# Patient Record
Sex: FEMALE | Race: WHITE | Employment: UNEMPLOYED | URBAN - METROPOLITAN AREA
[De-identification: names, ages, dates, MRNs, and addresses within clinical notes are randomized per-mention and may not be internally consistent; named-entity substitution may affect disease eponyms.]

---

## 2017-04-13 ENCOUNTER — APPOINTMENT (OUTPATIENT)
Dept: CT IMAGING | Age: 32
End: 2017-04-13
Attending: EMERGENCY MEDICINE
Payer: COMMERCIAL

## 2017-04-13 ENCOUNTER — HOSPITAL ENCOUNTER (EMERGENCY)
Age: 32
Discharge: HOME | End: 2017-04-13
Attending: EMERGENCY MEDICINE
Payer: COMMERCIAL

## 2017-04-13 VITALS
DIASTOLIC BLOOD PRESSURE: 65 MMHG | WEIGHT: 115 LBS | HEART RATE: 70 BPM | HEIGHT: 63 IN | RESPIRATION RATE: 16 BRPM | TEMPERATURE: 98 F | SYSTOLIC BLOOD PRESSURE: 108 MMHG | OXYGEN SATURATION: 99 % | BODY MASS INDEX: 20.37 KG/M2

## 2017-04-13 DIAGNOSIS — R11.0 NAUSEA: ICD-10-CM

## 2017-04-13 DIAGNOSIS — R10.9 ABDOMINAL PAIN, UNSPECIFIED LOCATION: Primary | ICD-10-CM

## 2017-04-13 PROCEDURE — 96374 THER/PROPH/DIAG INJ IV PUSH: CPT

## 2017-04-13 PROCEDURE — 85025 COMPLETE CBC W/AUTO DIFF WBC: CPT | Performed by: EMERGENCY MEDICINE

## 2017-04-13 PROCEDURE — 96361 HYDRATE IV INFUSION ADD-ON: CPT

## 2017-04-13 PROCEDURE — 99284 EMERGENCY DEPT VISIT MOD MDM: CPT

## 2017-04-13 PROCEDURE — 96375 TX/PRO/DX INJ NEW DRUG ADDON: CPT

## 2017-04-13 PROCEDURE — 80053 COMPREHEN METABOLIC PANEL: CPT | Performed by: EMERGENCY MEDICINE

## 2017-04-13 PROCEDURE — 81025 URINE PREGNANCY TEST: CPT

## 2017-04-13 PROCEDURE — 81003 URINALYSIS AUTO W/O SCOPE: CPT | Performed by: EMERGENCY MEDICINE

## 2017-04-13 PROCEDURE — 74177 CT ABD & PELVIS W/CONTRAST: CPT

## 2017-04-13 PROCEDURE — 83690 ASSAY OF LIPASE: CPT | Performed by: EMERGENCY MEDICINE

## 2017-04-13 RX ORDER — POLYETHYLENE GLYCOL 3350 17 G/17G
17 POWDER, FOR SOLUTION ORAL DAILY PRN
Qty: 30 EACH | Refills: 0 | Status: SHIPPED | OUTPATIENT
Start: 2017-04-13 | End: 2017-04-20

## 2017-04-13 RX ORDER — ONDANSETRON 4 MG/1
4 TABLET, ORALLY DISINTEGRATING ORAL EVERY 4 HOURS PRN
Qty: 20 TABLET | Refills: 0 | Status: SHIPPED | OUTPATIENT
Start: 2017-04-13

## 2017-04-13 RX ORDER — ONDANSETRON 2 MG/ML
4 INJECTION INTRAMUSCULAR; INTRAVENOUS ONCE
Status: COMPLETED | OUTPATIENT
Start: 2017-04-13 | End: 2017-04-13

## 2017-04-13 RX ORDER — BUPROPION HYDROCHLORIDE 150 MG/1
150 TABLET ORAL DAILY
COMMUNITY

## 2017-04-13 RX ORDER — DICYCLOMINE HCL 20 MG
20 TABLET ORAL 4 TIMES DAILY PRN
Qty: 30 TABLET | Refills: 0 | Status: SHIPPED | OUTPATIENT
Start: 2017-04-13 | End: 2017-05-13

## 2017-04-13 RX ORDER — SODIUM CHLORIDE 9 MG/ML
INJECTION, SOLUTION INTRAVENOUS CONTINUOUS
Status: DISCONTINUED | OUTPATIENT
Start: 2017-04-13 | End: 2017-04-14

## 2017-04-13 RX ORDER — KETOROLAC TROMETHAMINE 30 MG/ML
30 INJECTION, SOLUTION INTRAMUSCULAR; INTRAVENOUS ONCE
Status: COMPLETED | OUTPATIENT
Start: 2017-04-13 | End: 2017-04-13

## 2017-04-14 NOTE — ED PROVIDER NOTES
Patient Seen in: THE [facility] Emergency Department In [place]    History   Patient presents with:  Abdomen/Flank Pain (GI/)    Stated Complaint: ABD PAIN CRAMPING    HPI  This is a 41-year-old female who arrives with abdominal pain, cramping starting this Father    • Diabetes Maternal Grandmother    • Hypertension Paternal Grandmother    • Breast Cancer Maternal Grandmother    • Breast Cancer Other      M.Gr. Aunt x3   • High Cholesterol Father    • Colon Cancer [Other] Abhishek Look Maternal Grandmother There is no edema    NEURO: Alert and oriented x3. Muscle strength and sensory exam is grossly normal.  And the patient is neurologically intact with no focal findings.       ED Course     Labs Reviewed   URINALYSIS WITH CULTURE REFLEX - Abnormal; Notable physician for close follow-up. If there is any significant progression of your pain  or other symptoms you to return immediately to the emergency room.   My discharge instruct    Disposition and Plan     Clinical Impression:  Abdominal pain, unspecified lo

## 2017-09-18 NOTE — ED AVS SNAPSHOT
Mrs. Jacqui Hurtado   MRN: [id number]    Department:  [facility] Emergency Department in [place]   Date of Visit:  1/16/2018           Disclosure     Insurance plans vary and the physician(s) referred by the ER may not be covered by your plan.  Brigido tell this physician (or your personal doctor if your instructions are to return to your personal doctor) about any new or lasting problems. The primary care or specialist physician will see patients referred from the [facility] Emergency Department.  Mireya Desouza Myself

## 2017-09-29 ENCOUNTER — OFFICE VISIT (OUTPATIENT)
Dept: FAMILY MEDICINE CLINIC | Facility: CLINIC | Age: 32
End: 2017-09-29

## 2017-09-29 VITALS
BODY MASS INDEX: 21.23 KG/M2 | WEIGHT: 119.81 LBS | OXYGEN SATURATION: 98 % | TEMPERATURE: 98 F | HEART RATE: 88 BPM | DIASTOLIC BLOOD PRESSURE: 72 MMHG | RESPIRATION RATE: 16 BRPM | SYSTOLIC BLOOD PRESSURE: 116 MMHG | HEIGHT: 63 IN

## 2017-09-29 DIAGNOSIS — J30.9 ALLERGIC CONJUNCTIVITIS AND RHINITIS, BILATERAL: ICD-10-CM

## 2017-09-29 DIAGNOSIS — J01.00 ACUTE MAXILLARY SINUSITIS, RECURRENCE NOT SPECIFIED: Primary | ICD-10-CM

## 2017-09-29 DIAGNOSIS — S05.01XA ABRASION OF RIGHT CORNEA, INITIAL ENCOUNTER: ICD-10-CM

## 2017-09-29 DIAGNOSIS — H10.13 ALLERGIC CONJUNCTIVITIS AND RHINITIS, BILATERAL: ICD-10-CM

## 2017-09-29 PROCEDURE — 99213 OFFICE O/P EST LOW 20 MIN: CPT | Performed by: NURSE PRACTITIONER

## 2017-09-29 RX ORDER — NORGESTIMATE AND ETHINYL ESTRADIOL 7DAYSX3 LO
KIT ORAL
COMMUNITY
Start: 2017-05-05 | End: 2018-05-24

## 2017-09-29 RX ORDER — ZOLPIDEM TARTRATE 10 MG/1
TABLET ORAL
Refills: 0 | COMMUNITY
Start: 2017-09-18

## 2017-09-29 RX ORDER — AMOXICILLIN AND CLAVULANATE POTASSIUM 875; 125 MG/1; MG/1
1 TABLET, FILM COATED ORAL 2 TIMES DAILY
Qty: 20 TABLET | Refills: 0 | Status: SHIPPED | OUTPATIENT
Start: 2017-09-29 | End: 2017-10-09

## 2017-09-29 RX ORDER — OFLOXACIN 3 MG/ML
2 SOLUTION/ DROPS OPHTHALMIC 4 TIMES DAILY
Qty: 1 BOTTLE | Refills: 0 | Status: SHIPPED | OUTPATIENT
Start: 2017-09-29 | End: 2017-10-06

## 2017-09-29 RX ORDER — LISDEXAMFETAMINE DIMESYLATE 70 MG/1
CAPSULE ORAL
Refills: 0 | COMMUNITY
Start: 2017-09-14

## 2017-09-29 RX ORDER — ADAPALENE 0.1 G/100G
CREAM TOPICAL
Refills: 1 | COMMUNITY
Start: 2017-09-20

## 2017-09-29 NOTE — PROGRESS NOTES
CHIEF COMPLAINT:   Patient presents with:  Irritation: right eye irritation x2-3 days feels like there is something in pt eye. she tried changing contacts, light sensitivity, fatigued, headaches, facial and sinus pressure.       HPI:   Mikel Oats is Linaclotide (LINZESS OR) Take  by mouth.  Disp:  Rfl:    Norgestimate-Ethinyl Estradiol (ORTHO TRI-CYCLEN LO) 0.18/0.215/0.25 MG-25 MCG Oral Tab One po q day Disp: 3 Package Rfl: 4      Past Medical History:   Diagnosis Date   • WILFRED II (cervical intraepithe HENT: atraumatic, normocephalic,ears and throat are clear.  + mild tenderness upon palpation of maxillary sinuses  NECK: supple, non tender  LUNGS: clear to auscultation bilaterally. CARDIO: RRR without murmur  LYMPH: no preauricular lymphadenopathy.  No Patient Instructions     No contacts for one week  Follow up with eye doctor if no improvement in symptoms  Humidifier in room  Sleep propped  Push fluids  Limit dairy  Daily Zyrtec or Claritin  Flonase as directed    Conjunctivitis, Allergic    Conjunctiv Call your healthcare provider right away if any of these occur:  · Increased eyelid swelling  · New or worsening drainage from the eye  · Increasing redness around the eye  · Facial swelling  Date Last Reviewed: 6/14/2015  © 1535-0415 The [employer], · If your vision is affected by the corneal abrasion or if an eye patch was applied, do not drive a motor vehicle or operate machinery until all symptoms are gone. You may have trouble judging distances using only one eye.   · If your eyes are sensitive to The cornea is very painful when injured, but it usually heals quickly. It usually improves within 24 to 48 hours. If the injury is deep, your healthcare provider may apply an eye patch. This is to reduce pain and speed up the healing process.  An antibiotic © 5225-2952 [address], [address] [place]. All rights reserved. This information is not intended as a substitute for professional medical care. Always follow your healthcare professional's instructions.         Sinusit · Over-the-counter decongestants may be used unless a similar medicine was prescribed. Nasal sprays work the fastest. Use one that contains phenylephrine or oxymetazoline. First blow the nose gently. Then use the spray.  Do not use these medicines more ofte © 3326-2952 The [address] [place] [address], [address] [place]. All rights reserved. This information is not intended as a substitute for professional medical care. Always follow your healthcare professional's instructions.             Colin

## 2017-09-29 NOTE — PATIENT INSTRUCTIONS
No contacts for one week  Follow up with eye doctor if no improvement in symptoms  Humidifier in room  Sleep propped  Push fluids  Limit dairy  Daily Zyrtec or Claritin  Flonase as directed    Conjunctivitis, Allergic    Conjunctivitis is an irritation o Call your healthcare provider right away if any of these occur:  · Increased eyelid swelling  · New or worsening drainage from the eye  · Increasing redness around the eye  · Facial swelling  Date Last Reviewed: 6/14/2015  © 7632-6674 The [employer], · If your vision is affected by the corneal abrasion or if an eye patch was applied, do not drive a motor vehicle or operate machinery until all symptoms are gone. You may have trouble judging distances using only one eye.   · If your eyes are sensitive to The cornea is very painful when injured, but it usually heals quickly. It usually improves within 24 to 48 hours. If the injury is deep, your healthcare provider may apply an eye patch. This is to reduce pain and speed up the healing process.  An antibiotic © 3562-6055 [address], [address] [place]. All rights reserved. This information is not intended as a substitute for professional medical care. Always follow your healthcare professional's instructions.         Sinusit · Over-the-counter decongestants may be used unless a similar medicine was prescribed. Nasal sprays work the fastest. Use one that contains phenylephrine or oxymetazoline. First blow the nose gently. Then use the spray.  Do not use these medicines more ofte © 4352-6342 [address], [address] [place]. All rights reserved. This information is not intended as a substitute for professional medical care. Always follow your healthcare professional's instructions.

## 2018-01-16 ENCOUNTER — APPOINTMENT (OUTPATIENT)
Dept: GENERAL RADIOLOGY | Age: 33
End: 2018-01-16
Attending: PHYSICIAN ASSISTANT
Payer: COMMERCIAL

## 2018-01-16 ENCOUNTER — HOSPITAL ENCOUNTER (EMERGENCY)
Age: 33
Discharge: HOME | End: 2018-01-16
Attending: EMERGENCY MEDICINE
Payer: COMMERCIAL

## 2018-01-16 VITALS
OXYGEN SATURATION: 97 % | BODY MASS INDEX: 21.26 KG/M2 | HEART RATE: 113 BPM | TEMPERATURE: 99 F | DIASTOLIC BLOOD PRESSURE: 71 MMHG | HEIGHT: 63 IN | WEIGHT: 120 LBS | RESPIRATION RATE: 18 BRPM | SYSTOLIC BLOOD PRESSURE: 120 MMHG

## 2018-01-16 DIAGNOSIS — J11.1 INFLUENZA-LIKE SYNDROME: Primary | ICD-10-CM

## 2018-01-16 PROCEDURE — 99283 EMERGENCY DEPT VISIT LOW MDM: CPT

## 2018-01-16 PROCEDURE — 71046 X-RAY EXAM CHEST 2 VIEWS: CPT | Performed by: PHYSICIAN ASSISTANT

## 2018-01-16 RX ORDER — OSELTAMIVIR PHOSPHATE 75 MG/1
75 CAPSULE ORAL 2 TIMES DAILY
Qty: 10 CAPSULE | Refills: 0 | Status: SHIPPED | OUTPATIENT
Start: 2018-01-16 | End: 2018-01-21

## 2018-01-16 RX ORDER — BENZONATATE 200 MG/1
200 CAPSULE ORAL 3 TIMES DAILY PRN
Qty: 30 CAPSULE | Refills: 0 | Status: SHIPPED | OUTPATIENT
Start: 2018-01-16 | End: 2018-01-23

## 2018-01-17 NOTE — ED PROVIDER NOTES
I reviewed that chart and discussed the case. I have examined the patient and noted complaints of  Body aches, cough, felt like her heart was slightly racing. When she coughs. .    Diffuse body aches, congestion, fevers, chills.   Lungs are clear heart rat

## 2018-01-17 NOTE — ED PROVIDER NOTES
Patient Seen in: [facility] Emergency Department In [place]    History   Patient presents with:  Fever (infectious)    Stated Complaint: flu    ART Gonzales is a 28yo F who comes in with complaints of cough, body aches, fever, chills that started today. gray color and semitransparent, external ear canals normal, both ears   Nose: Nares symmetrical, septum midline, mucosa normal, clear watery discharge; no sinus tenderness   Throat: Lips, tongue, and mucosa are moist, pink, and intact; teeth intact.  Mild e with the patient, who expresses understanding.  All questions and concerns are addressed to the patient's satisfaction prior to discharge today.           Disposition and Plan     Clinical Impression:  Influenza-like syndrome  (primary encounter diagnosis)

## 2018-03-01 ENCOUNTER — APPOINTMENT (OUTPATIENT)
Dept: CT IMAGING | Age: 33
End: 2018-03-01
Attending: EMERGENCY MEDICINE
Payer: COMMERCIAL

## 2018-03-01 ENCOUNTER — HOSPITAL ENCOUNTER (EMERGENCY)
Age: 33
Discharge: HOME | End: 2018-03-01
Attending: EMERGENCY MEDICINE
Payer: COMMERCIAL

## 2018-03-01 VITALS
OXYGEN SATURATION: 99 % | HEART RATE: 72 BPM | WEIGHT: 120 LBS | DIASTOLIC BLOOD PRESSURE: 68 MMHG | TEMPERATURE: 99 F | HEIGHT: 63 IN | SYSTOLIC BLOOD PRESSURE: 110 MMHG | RESPIRATION RATE: 16 BRPM | BODY MASS INDEX: 21.26 KG/M2

## 2018-03-01 DIAGNOSIS — J01.00 ACUTE NON-RECURRENT MAXILLARY SINUSITIS: Primary | ICD-10-CM

## 2018-03-01 LAB
ALBUMIN SERPL-MCNC: 3.5 G/DL (ref 3.5–4.8)
ALP LIVER SERPL-CCNC: 45 U/L (ref 37–98)
ALT SERPL-CCNC: 26 U/L (ref 14–54)
AST SERPL-CCNC: 19 U/L (ref 15–41)
BASOPHILS # BLD AUTO: 0.09 X10(3) UL (ref 0–0.1)
BASOPHILS NFR BLD AUTO: 1 %
BILIRUB SERPL-MCNC: 0.3 MG/DL (ref 0.1–2)
BUN BLD-MCNC: 10 MG/DL (ref 8–20)
CALCIUM BLD-MCNC: 8.9 MG/DL (ref 8.3–10.3)
CHLORIDE: 111 MMOL/L (ref 101–111)
CO2: 25 MMOL/L (ref 22–32)
CREAT BLD-MCNC: 0.67 MG/DL (ref 0.55–1.02)
EOSINOPHIL # BLD AUTO: 0.14 X10(3) UL (ref 0–0.3)
EOSINOPHIL NFR BLD AUTO: 1.5 %
ERYTHROCYTE [DISTWIDTH] IN BLOOD BY AUTOMATED COUNT: 12.3 % (ref 11.5–16)
GLUCOSE BLD-MCNC: 81 MG/DL (ref 70–99)
HCT VFR BLD AUTO: 37.7 % (ref 34–50)
HGB BLD-MCNC: 12.8 G/DL (ref 12–16)
IMMATURE GRANULOCYTE COUNT: 0.02 X10(3) UL (ref 0–1)
IMMATURE GRANULOCYTE RATIO %: 0.2 %
LYMPHOCYTES # BLD AUTO: 2.98 X10(3) UL (ref 0.9–4)
LYMPHOCYTES NFR BLD AUTO: 31.9 %
M PROTEIN MFR SERPL ELPH: 7.5 G/DL (ref 6.1–8.3)
MCH RBC QN AUTO: 30.6 PG (ref 27–33.2)
MCHC RBC AUTO-ENTMCNC: 34 G/DL (ref 31–37)
MCV RBC AUTO: 90.2 FL (ref 81–100)
MONOCYTES # BLD AUTO: 0.48 X10(3) UL (ref 0.1–1)
MONOCYTES NFR BLD AUTO: 5.1 %
NEUTROPHIL ABS PRELIM: 5.64 X10 (3) UL (ref 1.3–6.7)
NEUTROPHILS # BLD AUTO: 5.64 X10(3) UL (ref 1.3–6.7)
NEUTROPHILS NFR BLD AUTO: 60.3 %
PLATELET # BLD AUTO: 345 10(3)UL (ref 150–450)
POCT LOT NUMBER: NORMAL
POCT URINE PREGNANCY: NEGATIVE
POTASSIUM SERPL-SCNC: 3.7 MMOL/L (ref 3.6–5.1)
RBC # BLD AUTO: 4.18 X10(6)UL (ref 3.8–5.1)
RED CELL DISTRIBUTION WIDTH-SD: 40.4 FL (ref 35.1–46.3)
SODIUM SERPL-SCNC: 142 MMOL/L (ref 136–144)
WBC # BLD AUTO: 9.4 X10(3) UL (ref 4–13)

## 2018-03-01 PROCEDURE — 81025 URINE PREGNANCY TEST: CPT

## 2018-03-01 PROCEDURE — 99284 EMERGENCY DEPT VISIT MOD MDM: CPT

## 2018-03-01 PROCEDURE — 96361 HYDRATE IV INFUSION ADD-ON: CPT

## 2018-03-01 PROCEDURE — 80053 COMPREHEN METABOLIC PANEL: CPT | Performed by: EMERGENCY MEDICINE

## 2018-03-01 PROCEDURE — 96374 THER/PROPH/DIAG INJ IV PUSH: CPT

## 2018-03-01 PROCEDURE — 85025 COMPLETE CBC W/AUTO DIFF WBC: CPT | Performed by: EMERGENCY MEDICINE

## 2018-03-01 PROCEDURE — 96375 TX/PRO/DX INJ NEW DRUG ADDON: CPT

## 2018-03-01 PROCEDURE — 70486 CT MAXILLOFACIAL W/O DYE: CPT | Performed by: EMERGENCY MEDICINE

## 2018-03-01 RX ORDER — MORPHINE SULFATE 4 MG/ML
4 INJECTION, SOLUTION INTRAMUSCULAR; INTRAVENOUS ONCE
Status: COMPLETED | OUTPATIENT
Start: 2018-03-01 | End: 2018-03-01

## 2018-03-01 RX ORDER — KETOROLAC TROMETHAMINE 30 MG/ML
30 INJECTION, SOLUTION INTRAMUSCULAR; INTRAVENOUS ONCE
Status: COMPLETED | OUTPATIENT
Start: 2018-03-01 | End: 2018-03-01

## 2018-03-01 RX ORDER — HYDROCODONE BITARTRATE AND ACETAMINOPHEN 5; 325 MG/1; MG/1
1-2 TABLET ORAL EVERY 4 HOURS PRN
Qty: 20 TABLET | Refills: 0 | Status: SHIPPED | OUTPATIENT
Start: 2018-03-01 | End: 2018-03-08

## 2018-03-01 RX ORDER — ONDANSETRON 2 MG/ML
4 INJECTION INTRAMUSCULAR; INTRAVENOUS ONCE
Status: COMPLETED | OUTPATIENT
Start: 2018-03-01 | End: 2018-03-01

## 2018-03-02 NOTE — ED INITIAL ASSESSMENT (HPI)
STS LEFT JAW AND EAR PAIN X 1 WEEK. STS SAW A DENTIST EARLY THIS WEEK AND HAD HER BOTTOM LEFT WISDOM TOOTH EXTRACTED. STS NO RELIEF OF PAIN AFTER PROCEDURE. PT STS SAW DENTIST TODAY FOR FOLLOW UP VISIT.

## 2018-03-02 NOTE — ED PROVIDER NOTES
Patient Seen in: [facility] Emergency Department In [place]    History   Patient presents with:  Headache (neurologic)    Stated Complaint: HEADACHE    HPI    29-year-old white female who presents emergency room today for complaint of facial pain.   Rebeka above.    Physical Exam   ED Triage Vitals [03/01/18 2056]  BP: 112/72  Pulse: 88  Resp: 20  Temp: 98.9 °F (37.2 °C)  Temp src: Temporal  SpO2: 100 %  O2 Device: None (Room air)    Current:/72   Pulse 88   Temp 98.9 °F (37.2 °C) (Temporal)   Resp 20 deviation or bulla formation. OMU: Bethena Anderson is mild narrowing of the left ostiomeatal unit due to mucoperiosteal thickening.  No obstruction.   SINUSES: Bethena Anderson is a moderate degree of sinus opacification with mucoperiosteal thickening involving the left maxi MD Rico 25 [id number]    In 2 days          Medications Prescribed:  Current Discharge Medication List    START taking these medications    HYDROcodone-acetaminophen 5-325 MG Oral Tab  Take 1-2 tablets by mouth every 4

## 2019-10-16 ENCOUNTER — HOSPITAL ENCOUNTER (OUTPATIENT)
Dept: GENERAL RADIOLOGY | Age: 34
Discharge: HOME | End: 2019-10-16
Attending: PHYSICIAN ASSISTANT
Payer: COMMERCIAL

## 2019-10-16 DIAGNOSIS — J40 BRONCHITIS: ICD-10-CM

## 2019-10-16 DIAGNOSIS — R06.2 WHEEZING: ICD-10-CM

## 2019-10-16 PROCEDURE — 71046 X-RAY EXAM CHEST 2 VIEWS: CPT | Performed by: PHYSICIAN ASSISTANT

## 2023-06-30 ENCOUNTER — HOSPITAL ENCOUNTER (OUTPATIENT)
Age: 38
Discharge: HOME | End: 2023-06-30
Payer: COMMERCIAL

## 2023-06-30 VITALS
SYSTOLIC BLOOD PRESSURE: 132 MMHG | BODY MASS INDEX: 32.2 KG/M2 | HEART RATE: 110 BPM | RESPIRATION RATE: 16 BRPM | TEMPERATURE: 98 F | HEIGHT: 62 IN | WEIGHT: 175 LBS | DIASTOLIC BLOOD PRESSURE: 80 MMHG | OXYGEN SATURATION: 99 %

## 2023-06-30 DIAGNOSIS — K04.7 DENTAL INFECTION: Primary | ICD-10-CM

## 2023-06-30 DIAGNOSIS — K08.9 POOR DENTITION: ICD-10-CM

## 2023-06-30 PROCEDURE — 90471 IMMUNIZATION ADMIN: CPT

## 2023-06-30 PROCEDURE — 99204 OFFICE O/P NEW MOD 45 MIN: CPT

## 2023-06-30 PROCEDURE — 99203 OFFICE O/P NEW LOW 30 MIN: CPT

## 2023-06-30 RX ORDER — HYDROCODONE BITARTRATE AND ACETAMINOPHEN 7.5; 325 MG/1; MG/1
1-2 TABLET ORAL EVERY 6 HOURS PRN
Qty: 4 TABLET | Refills: 0 | Status: SHIPPED | OUTPATIENT
Start: 2023-06-30 | End: 2023-07-01

## 2023-06-30 RX ORDER — TRAMADOL HYDROCHLORIDE 50 MG/1
50 TABLET ORAL EVERY 6 HOURS PRN
COMMUNITY

## 2023-06-30 RX ORDER — IBUPROFEN 600 MG/1
600 TABLET ORAL EVERY 8 HOURS PRN
Qty: 21 TABLET | Refills: 0 | Status: SHIPPED | OUTPATIENT
Start: 2023-06-30 | End: 2023-07-07

## 2023-06-30 RX ORDER — CHLORHEXIDINE GLUCONATE 0.12 MG/ML
15 RINSE ORAL 2 TIMES DAILY
Qty: 1 EACH | Refills: 0 | Status: SHIPPED | OUTPATIENT
Start: 2023-06-30

## 2023-06-30 RX ORDER — AMOXICILLIN 500 MG/1
500 TABLET, FILM COATED ORAL 3 TIMES DAILY
Qty: 30 TABLET | Refills: 0 | Status: SHIPPED | OUTPATIENT
Start: 2023-06-30 | End: 2023-07-10

## 2023-06-30 RX ORDER — ACETAMINOPHEN 500 MG
1000 TABLET ORAL ONCE
Status: COMPLETED | OUTPATIENT
Start: 2023-06-30 | End: 2023-06-30

## 2023-06-30 NOTE — ED INITIAL ASSESSMENT (HPI)
Last night, pt broke tooth located in the right upper palate. Also has pain from TMJ. Took IBU at 1230 today.

## (undated) NOTE — ED AVS SNAPSHOT
Mrs. Yevgeniy Davison   MRN: [id number]    Department:  [facility] Emergency Department in [place]   Date of Visit:  3/1/2018           Disclosure     Insurance plans vary and the physician(s) referred by the ER may not be covered by your plan.  Please c tell this physician (or your personal doctor if your instructions are to return to your personal doctor) about any new or lasting problems. The primary care or specialist physician will see patients referred from the [facility] Emergency Department.  Lauren Ross

## (undated) NOTE — ED AVS SNAPSHOT
THE [facility] Emergency Department in [address] [facility]    Phone:  [phone]    Fax:  [phone]           Mrs. Tucker Moy   MRN: [id number]    Department:  THE [facility] Emergency Department in [place]   Date o IF THERE IS ANY CHANGE OR WORSENING OF YOUR CONDITION, CALL YOUR PRIMARY CARE PHYSICIAN AT ONCE OR RETURN IMMEDIATELY TO THE EMERGENCY DEPARTMENT.     If you have been prescribed any medication(s), please fill your prescription right away and begin taking

## (undated) NOTE — ED AVS SNAPSHOT
THE [facility] Emergency Department in [address] [facility]    Phone:  [phone]    Fax:  [phone]           Mrs. Keenan Alma   MRN: [id number]    Department:  THE [facility] Emergency Department in [place]   Date o Where to Get Your Medications      You can get these medications from any pharmacy     Bring a paper prescription for each of these medications    - Dicyclomine HCl 20 MG Tabs  - ondansetron 4 MG Tbdp  - PEG 3350 Pack              Discharge Instructions You were examined and treated today on an urgent basis only. This was not a substitute for ongoing medical care. Often, one Emergency Department visit does not uncover every injury or illness.  If you have been referred to a primary care or a specialist ph Hodan Galeano [address] ([address]) [address] [facility]  [facility] (Donis Dakin) [phone] [address] ([address]) [phone]                Additional Information       We are concerned for your o of generalized abdominal pain, cramping, and nausea for (1)day. CONTRAST USED:  61 cc of Omnipaque 350     FINDINGS:          Clear lung bases. Unremarkable solid abdominal organs. Negative for kidney stones or calcified gallstones.  No free intraperit